# Patient Record
Sex: FEMALE | Race: BLACK OR AFRICAN AMERICAN | Employment: UNEMPLOYED | URBAN - METROPOLITAN AREA
[De-identification: names, ages, dates, MRNs, and addresses within clinical notes are randomized per-mention and may not be internally consistent; named-entity substitution may affect disease eponyms.]

---
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
## 2025-01-17 ENCOUNTER — ROUTINE PRENATAL (OUTPATIENT)
Age: 20
End: 2025-01-17
Payer: MEDICAID

## 2025-01-17 VITALS
BODY MASS INDEX: 22.95 KG/M2 | TEMPERATURE: 98.1 F | RESPIRATION RATE: 16 BRPM | WEIGHT: 129.5 LBS | HEART RATE: 82 BPM | DIASTOLIC BLOOD PRESSURE: 70 MMHG | HEIGHT: 63 IN | SYSTOLIC BLOOD PRESSURE: 108 MMHG | OXYGEN SATURATION: 98 %

## 2025-01-17 DIAGNOSIS — Z34.90 PREGNANCY, UNSPECIFIED GESTATIONAL AGE: Primary | ICD-10-CM

## 2025-01-17 PROCEDURE — 0500F INITIAL PRENATAL CARE VISIT: CPT | Performed by: OBSTETRICS & GYNECOLOGY

## 2025-01-17 SDOH — ECONOMIC STABILITY: FOOD INSECURITY: WITHIN THE PAST 12 MONTHS, THE FOOD YOU BOUGHT JUST DIDN'T LAST AND YOU DIDN'T HAVE MONEY TO GET MORE.: SOMETIMES TRUE

## 2025-01-17 SDOH — ECONOMIC STABILITY: FOOD INSECURITY: WITHIN THE PAST 12 MONTHS, YOU WORRIED THAT YOUR FOOD WOULD RUN OUT BEFORE YOU GOT MONEY TO BUY MORE.: SOMETIMES TRUE

## 2025-01-17 ASSESSMENT — PATIENT HEALTH QUESTIONNAIRE - PHQ9
SUM OF ALL RESPONSES TO PHQ9 QUESTIONS 1 & 2: 0
SUM OF ALL RESPONSES TO PHQ QUESTIONS 1-9: 0
2. FEELING DOWN, DEPRESSED OR HOPELESS: NOT AT ALL
1. LITTLE INTEREST OR PLEASURE IN DOING THINGS: NOT AT ALL
SUM OF ALL RESPONSES TO PHQ QUESTIONS 1-9: 0

## 2025-01-17 NOTE — PROGRESS NOTES
Initial Prenatal Note    CC: Amenorrhea, positive pregnancy test    HPI:  Billie Lechuga is a 19 y.o.  who presents for initial prenatal appointment. Patient is transferred from [facility]. Records have not been received yet for this patient.     LMP history:  Patient reports an LMP of 2024 which would give her a IWONA of 2025, making her 25 w 3d.    Patient reports being given a due date of 2025, which would make her 26 w 1 d.     We are unable to confirm appropriate due date at this time due to lack of records. Awaiting fax.       Relevant past pregnancy history:  She has the following pregnancy history: N/A   She does not history of  delivery.    Relevant past medical history:   Noncontributory    Last Pap: N/A    Relevant social history:  Her occupation is: N/A.   Her partner's name is Mio.    1. Have you been to the ER, urgent care clinic, or hospitalized since your last visit? New patient    2. Have you seen or consulted any other health care providers outside of the [facility] since your last visit? New patient    She declines  a chaperone during the gynecologic exam today.      Vandana Landry RN    
had an anatomy scan.  She believes she was given a due date in mid April.  We called [facility] today.  But were unable to speak with anyone about obtaining records.  At one point my RN was hung up on.  Will attempt again to obtain records.  Discussed coming back next week for an ultrasound.  I also asked the patient if she could access records on her phone.  She states she does not believe that she can.  Asthma   -Pulmicort and albuterol on her med list.  She denies taking these medications on a daily basis.  She reports that she does not even have these medications at home.  She reports she has not had an asthma attack since she was 10 years old.  She reports that she has never been intubated for an asthma attack.  She reports that she is never been hospitalized because of her asthma.    Intrauterine pregnancy with issues addressed in problem list.  We discussed genetic testing screening options for the pregnancy and offered NIPT and the patient reports she declines.  We discussed carrier screening as per ACOG guidelines for CF, SMA, DMD, and Fragile X syndrome and the patient declines carrier screening.    Course of pregnancy discussed including visit schedule, routine U/S, glucola testing, etc.  Avoid alcoholic beverages and illicit/recreational drugs use.  Take prenatal vitamins or folic acid daily.  Hospital and practice style discussed with coverage system.  Discussed nutrition, toxoplasmosis precautions, sexual activity, exercise, need for influenza vaccine, environmental and work hazards, travel advice, screen for domestic violence, need for seat belts.  Discussed seafood, unpasteurized dairy products, deli meat, artificial sweeteners, and caffeine.  Discussed current prescription drug use. Given medication list.  Discussed the use of over the counter medications and chemicals.  Route of delivery discussed, including risks, benefits  Handouts given to pt.      Patient will return to office   Return

## 2025-01-23 ENCOUNTER — ROUTINE PRENATAL (OUTPATIENT)
Age: 20
End: 2025-01-23

## 2025-01-23 VITALS
OXYGEN SATURATION: 100 % | DIASTOLIC BLOOD PRESSURE: 69 MMHG | WEIGHT: 130 LBS | SYSTOLIC BLOOD PRESSURE: 103 MMHG | HEART RATE: 80 BPM | BODY MASS INDEX: 23.03 KG/M2

## 2025-01-23 DIAGNOSIS — Z34.90 PREGNANCY, UNSPECIFIED GESTATIONAL AGE: Primary | ICD-10-CM

## 2025-01-23 PROCEDURE — 0502F SUBSEQUENT PRENATAL CARE: CPT | Performed by: OBSTETRICS & GYNECOLOGY

## 2025-01-23 RX ORDER — ASPIRIN 81 MG/1
81 TABLET ORAL DAILY
Qty: 30 TABLET | Refills: 6 | Status: SHIPPED | OUTPATIENT
Start: 2025-01-23

## 2025-01-23 NOTE — PROGRESS NOTES
Patient here for return OB visit at 27w0d. She reports no concerns.    Growth US today lagging one week. However, uncertain if we have to right IWONA established. We still have no records. Records release signed again today. Follow up in one week. Referral MFM.     She reports good fetal movement.   She denies vaginal bleeding, loss of fluid, abnormal vaginal discharge, UTI symptoms, cramping, or contractions.       /69   Pulse 80   Wt 59 kg (130 lb)   LMP 2024   SpO2 100%   BMI 23.03 kg/m²      Prenatal Fetal Information  Fetal HR: 140s  Movement: Present      Patient Active Problem List    Diagnosis Date Noted    Pregnancy 2025     Primary Provider: Aiden     EDC by: US/LMP  Social: transfer from [facility] at 27w0d   IOB labs:   Genetic Screening:Panoroma____ Horizon____  Anatomy:  3rd TM labs: GTT___ Hgb___ Plts___  Vaccines:   Flu:__ TDAP:__ RSV ___ 32-36 weeks September-January   Rhogam:      GBS:    Pain management in labor :   -Epidural  -Lamaze   -Hydrotherapy        Postpartum  -Breast/Bottle   -Circ: yes!  -Pap: NA  BC:      Problem List:  1) Transfer from [facility] at 27w0d   -no records for the first two visits.            Return in about 1 week (around 2025) for + glucola Rhogam ?.    Sarah Wolfe MD

## 2025-01-24 LAB
ABO GROUP BLD: NORMAL
BLD GP AB SCN SERPL QL: NEGATIVE
ERYTHROCYTE [DISTWIDTH] IN BLOOD BY AUTOMATED COUNT: 12.2 % (ref 11.7–15.4)
HBV SURFACE AG SERPL QL IA: NEGATIVE
HCT VFR BLD AUTO: 32.2 % (ref 34–46.6)
HCV IGG SERPL QL IA: NON REACTIVE
HGB BLD-MCNC: 10.8 G/DL (ref 11.1–15.9)
HIV 1+2 AB+HIV1 P24 AG SERPL QL IA: NON REACTIVE
MCH RBC QN AUTO: 32 PG (ref 26.6–33)
MCHC RBC AUTO-ENTMCNC: 33.5 G/DL (ref 31.5–35.7)
MCV RBC AUTO: 95 FL (ref 79–97)
PLATELET # BLD AUTO: 236 X10E3/UL (ref 150–450)
RBC # BLD AUTO: 3.38 X10E6/UL (ref 3.77–5.28)
RH BLD: POSITIVE
RUBV IGG SERPL IA-ACNC: 3.04 INDEX
VZV IGG SER QL IA: NON REACTIVE
WBC # BLD AUTO: 5 X10E3/UL (ref 3.4–10.8)

## 2025-01-25 LAB — TREPONEMA PALLIDUM IGG+IGM AB [PRESENCE] IN SERUM OR PLASMA BY IMMUNOASSAY: NON REACTIVE

## 2025-01-27 LAB
HGB A MFR BLD ELPH: 97.5 % (ref 96.4–98.8)
HGB A2 MFR BLD ELPH: 2.5 % (ref 1.8–3.2)
HGB F MFR BLD ELPH: 0 % (ref 0–2)
HGB FRACT BLD-IMP: NORMAL
HGB S MFR BLD ELPH: 0 %

## 2025-02-04 ENCOUNTER — ROUTINE PRENATAL (OUTPATIENT)
Age: 20
End: 2025-02-04
Payer: MEDICAID

## 2025-02-04 VITALS — SYSTOLIC BLOOD PRESSURE: 111 MMHG | DIASTOLIC BLOOD PRESSURE: 74 MMHG | HEART RATE: 109 BPM

## 2025-02-04 DIAGNOSIS — Z3A.28 28 WEEKS GESTATION OF PREGNANCY: Primary | ICD-10-CM

## 2025-02-04 PROCEDURE — 99203 OFFICE O/P NEW LOW 30 MIN: CPT | Performed by: OBSTETRICS & GYNECOLOGY

## 2025-02-04 PROCEDURE — 76811 OB US DETAILED SNGL FETUS: CPT | Performed by: OBSTETRICS & GYNECOLOGY

## 2025-02-04 NOTE — PROCEDURES
PATIENT: PRABHAKAR BEAR   -  : 2005   -  DOS:2025   -  INTERPRETING PROVIDER:Davdi Meade,   Indication  ========    FGR on outside scan    Method  ======    Transabdominal ultrasound examination. View: suboptimal due to unfavorable fetal position. suboptimal due to advanced gestational age    Dating  ======    LMP on: 2024  Cycle: regular cycle  GA by LMP 28 w + 0 d  IWONA by LMP: 2025  GA by prior assessment 28 w + 5 d  IWONA by prior assessment: 2025  Previous Ultrasound on: 2025  Type of prior assessment: GA  GA at prior assessment date 27 w + 0 d  GA by previous U/S 28 w + 5 d  IWONA by previous Ultrasound: 2025  Ultrasound examination on: 2025  GA by U/S based upon: AC, BPD, Femur, HC  GA by U/S 28 w + 4 d  IWONA by U/S: 2025  Assigned: based on the LMP, selected on 2025  Assigned GA 28 w + 0 d  Assigned IWONA: 2025    Fetal Growth Overview  =================    Exam date        GA              BPD (mm)          HC (mm)              AC (mm)              FL (mm)             HL (mm)             EFW (g)  2025          28w 0d        72.2    70%        262.5     36%        229.8    23%        55.3     69%        47.1    36%        1188     45%    Fetal Biometry  ============    Standard  BPD 72.2 mm 29w 0d 70% Hadlock  OFD 92.5 mm 29w 6d 90% Tri  .5 mm 28w 4d 36% Hadlock  Cerebellum tr 33.7 mm 28w 3d 64% Hill  .8 mm 27w 2d 23% Hadlock  Femur 55.3 mm 29w 1d 69% Hadlock  Humerus 47.1 mm 27w 5d 36% Tri  EFW 1,188 g 27w 6d 45% Hadlock  EFW (lb) 2 lb  EFW (oz) 10 oz  EFW by: Hadlock (BPD-HC-AC-FL)  Extended   4.9 mm  CM 5.6 mm  21% Nicolaides  Head / Face / Neck  Nasal bone: present  Other Structures   bpm    General Evaluation  ==============    Cardiac activity present.  bpm. Fetal movements: visualized. Presentation: Cephalic  Placenta: Placental site: fundal. Placental edge-to-cervical os distance 12.3 cm  Umbilical cord:

## 2025-02-04 NOTE — PROGRESS NOTES
Patient was seen 2/4/2025      Please look under media to view full consult and ultrasound report in ViewPoint.         David Meade MD  Maternal Fetal Medicine

## 2025-03-03 DIAGNOSIS — Z34.90 PREGNANCY, UNSPECIFIED GESTATIONAL AGE: Primary | ICD-10-CM

## 2025-03-04 ENCOUNTER — ROUTINE PRENATAL (OUTPATIENT)
Age: 20
End: 2025-03-04
Payer: MEDICAID

## 2025-03-04 ENCOUNTER — TELEPHONE (OUTPATIENT)
Age: 20
End: 2025-03-04

## 2025-03-04 VITALS — SYSTOLIC BLOOD PRESSURE: 110 MMHG | DIASTOLIC BLOOD PRESSURE: 71 MMHG | HEART RATE: 95 BPM

## 2025-03-04 DIAGNOSIS — Z34.90 PREGNANCY, UNSPECIFIED GESTATIONAL AGE: ICD-10-CM

## 2025-03-04 PROCEDURE — 76816 OB US FOLLOW-UP PER FETUS: CPT | Performed by: OBSTETRICS & GYNECOLOGY

## 2025-03-04 NOTE — PROCEDURES
artery: normal  Umbilical A sampling site: midcord  Umbilical A PI 0.72  7% Ebbing  Umbilical A RI 0.52  9% Maicol  Umbilical A PS 46.88 cm/s  49% Ebbing  Umbilical A ED 24.22 cm/s  Umbilical A EDF: positive  Umbilical A TAmax 35.14 cm/s  72% Ebbing  Umbilical A MD 23.72 cm/s  Umbilical A S / D 2.09  13% Maicol  Umbilical A  bpm    Findings  =======    Intrauterine Duron pregnancy at 32w 0d by clinical dates.  EFW is 1739 g at 20%, abdominal circumference at 14%.  Anatomy visualized as stated above.  Amniotic fluid: normal.  Placenta is fundal.  Cephalic presentation.  Biophysical profile score is 8/8.    Umbilical Artery Doppler studies were performed and positive end diastolic flow was demonstrated with normal resistance for the given gestational age.    The ultrasound findings as listed above and diagnostic limitations of ultrasound imaging, including inability to exclude all anomalies, have been reviewed with the patient. All  questions and concerns addressed.    Consultation  ==========    PRABHAKAR is 19 yrs of age,  at 32w 0d, referred due to suspected IUGR on an outside scan. The patient related that this scan was performed on --she does not  know the results and we do not have a copy of such.    PMH: None  POB: None  Meds: None  ROS: Her review of systems is negative. She does not complain of headaches, dizziness, visual or auditory symptoms, shortness of breath, palpitations, rashes, skeletal  problems, gastrointestinal symptoms, urinary symptoms, or any other complaints. Thus far in the pregnancy, she does not report any cramping, bleeding, or other  problems.    We reviewed that today's scan shows appropriate fetal growth. The anatomic survey is limited by the late EGA.  Follow up biometry continues to reveal normal fetal growth.  Further follow up is not planned.    Recommendations  ==============    No Maternal Fetal Medicine follow up is indicated. We will gladly see your patient

## 2025-03-04 NOTE — TELEPHONE ENCOUNTER
I let patient know that she can do her glucola but she should not only be seeing the  center  center is suppose to be the ADDITION care.. She also has to come to our office for prenatal visit as well.

## 2025-03-06 ENCOUNTER — ROUTINE PRENATAL (OUTPATIENT)
Age: 20
End: 2025-03-06
Payer: MEDICAID

## 2025-03-06 VITALS
OXYGEN SATURATION: 98 % | TEMPERATURE: 97.6 F | SYSTOLIC BLOOD PRESSURE: 113 MMHG | HEART RATE: 72 BPM | DIASTOLIC BLOOD PRESSURE: 75 MMHG | RESPIRATION RATE: 16 BRPM | HEIGHT: 63 IN | WEIGHT: 136.8 LBS | BODY MASS INDEX: 24.24 KG/M2

## 2025-03-06 DIAGNOSIS — Z34.90 PREGNANCY, UNSPECIFIED GESTATIONAL AGE: Primary | ICD-10-CM

## 2025-03-06 PROCEDURE — 0502F SUBSEQUENT PRENATAL CARE: CPT | Performed by: OBSTETRICS & GYNECOLOGY

## 2025-03-06 PROCEDURE — 90715 TDAP VACCINE 7 YRS/> IM: CPT | Performed by: OBSTETRICS & GYNECOLOGY

## 2025-03-06 PROCEDURE — 90471 IMMUNIZATION ADMIN: CPT | Performed by: OBSTETRICS & GYNECOLOGY

## 2025-03-06 NOTE — PROGRESS NOTES
Patient here for return OB visit at 33w0d.       She reports good fetal movement.   She denies vaginal bleeding, loss of fluid, abnormal vaginal discharge, UTI symptoms, cramping, or contractions.       /75 (Site: Right Upper Arm, Position: Sitting)   Pulse 72   Temp 97.6 °F (36.4 °C) (Oral)   Resp 16   Ht 1.6 m (5' 3\")   Wt 62.1 kg (136 lb 12.8 oz)   LMP 2024   SpO2 98%   BMI 24.23 kg/m²        Prenatal Fetal Information  Fetal HR: 135  Movement: Present      Patient Active Problem List    Diagnosis Date Noted    Pregnancy 2025     Primary Provider: Aiden ENCISO   EDC by: US/LMP  Social: transfer from [facility] at 27w0d   IOB labs: Hep B neg.Hep C neg. RPR neg. HIV neg. Varicella NON IMMUNE /Rubella immune O Positive  . Normal hemoglobin electrophoresis. Hgb__. Platelet count____   Genetic Screening:Panoroma____ Horizon____  Anatomy:  3rd TM labs: GTT___ Hgb___ Plts___  Vaccines:   Flu:__ TDAP:__ RSV ___ 32-36 weeks September-January   Rhogam:      GBS:    Pain management in labor :   -Epidural  -Lamaze   -Hydrotherapy        Postpartum  -Breast/Bottle   -Circ: yes!  -Pap: NA  -BC:      Problem List:  1) Transfer from [facility] at 27w0d   -no records for the first two visits.   2) Varicella NONIMMUNE            Return in about 2 weeks (around 3/20/2025).    Sarah Wolfe MD

## 2025-03-06 NOTE — PROGRESS NOTES
Billie Lechuga is 33w0d   Doing well  +FM  Denies LOF, bleeding/spotting, cramping, headache, blurred vision, edema, or RUQ pain.     Desires Tdap today

## 2025-03-07 LAB
BLD GP AB SCN SERPL QL: NEGATIVE
ERYTHROCYTE [DISTWIDTH] IN BLOOD BY AUTOMATED COUNT: 12 % (ref 11.7–15.4)
FERRITIN SERPL-MCNC: 13 NG/ML (ref 15–77)
GLUCOSE 1H P 50 G GLC PO SERPL-MCNC: 82 MG/DL (ref 70–139)
HCT VFR BLD AUTO: 31.6 % (ref 34–46.6)
HGB BLD-MCNC: 10.8 G/DL (ref 11.1–15.9)
MCH RBC QN AUTO: 31.9 PG (ref 26.6–33)
MCHC RBC AUTO-ENTMCNC: 34.2 G/DL (ref 31.5–35.7)
MCV RBC AUTO: 93 FL (ref 79–97)
PLATELET # BLD AUTO: 241 X10E3/UL (ref 150–450)
RBC # BLD AUTO: 3.39 X10E6/UL (ref 3.77–5.28)
WBC # BLD AUTO: 6.8 X10E3/UL (ref 3.4–10.8)

## 2025-03-08 LAB — TREPONEMA PALLIDUM IGG+IGM AB [PRESENCE] IN SERUM OR PLASMA BY IMMUNOASSAY: NON REACTIVE

## 2025-03-09 ENCOUNTER — RESULTS FOLLOW-UP (OUTPATIENT)
Age: 20
End: 2025-03-09

## 2025-03-09 RX ORDER — FERROUS SULFATE 325(65) MG
325 TABLET, DELAYED RELEASE (ENTERIC COATED) ORAL
Qty: 30 TABLET | Refills: 11 | Status: SHIPPED | OUTPATIENT
Start: 2025-03-09

## 2025-03-20 ENCOUNTER — ROUTINE PRENATAL (OUTPATIENT)
Age: 20
End: 2025-03-20
Payer: MEDICAID

## 2025-03-20 VITALS
WEIGHT: 132.2 LBS | DIASTOLIC BLOOD PRESSURE: 78 MMHG | SYSTOLIC BLOOD PRESSURE: 106 MMHG | OXYGEN SATURATION: 98 % | HEART RATE: 76 BPM | RESPIRATION RATE: 16 BRPM | HEIGHT: 63 IN | TEMPERATURE: 97.7 F | BODY MASS INDEX: 23.42 KG/M2

## 2025-03-20 DIAGNOSIS — N89.8 VAGINAL DISCHARGE: Primary | ICD-10-CM

## 2025-03-20 DIAGNOSIS — Z34.90 PREGNANCY, UNSPECIFIED GESTATIONAL AGE: ICD-10-CM

## 2025-03-20 PROCEDURE — 99212 OFFICE O/P EST SF 10 MIN: CPT | Performed by: OBSTETRICS & GYNECOLOGY

## 2025-03-20 NOTE — PROGRESS NOTES
Billie Lechuga is 35w0d   Doing well  +FM  Denies bleeding/spotting, cramping, headache, blurred vision, edema, or RUQ pain.     Reports 2 episodes of LOF - Monday and yesterday

## 2025-03-24 LAB
A VAGINAE DNA VAG QL NAA+PROBE: ABNORMAL SCORE
BVAB2 DNA VAG QL NAA+PROBE: ABNORMAL SCORE
C ALBICANS DNA VAG QL NAA+PROBE: POSITIVE
C GLABRATA DNA VAG QL NAA+PROBE: NEGATIVE
C TRACH DNA SPEC QL NAA+PROBE: NEGATIVE
MEGA1 DNA VAG QL NAA+PROBE: ABNORMAL SCORE
N GONORRHOEA DNA VAG QL NAA+PROBE: POSITIVE
T VAGINALIS DNA VAG QL NAA+PROBE: NEGATIVE

## 2025-03-25 ENCOUNTER — RESULTS FOLLOW-UP (OUTPATIENT)
Age: 20
End: 2025-03-25

## 2025-03-25 DIAGNOSIS — O98.213 GONORRHEA AFFECTING PREGNANCY IN THIRD TRIMESTER: ICD-10-CM

## 2025-03-25 DIAGNOSIS — N89.8 VAGINAL DISCHARGE: Primary | ICD-10-CM

## 2025-03-25 DIAGNOSIS — A54.9 GONORRHEA: ICD-10-CM

## 2025-03-25 RX ORDER — CEFTRIAXONE 500 MG/1
500 INJECTION, POWDER, FOR SOLUTION INTRAMUSCULAR; INTRAVENOUS ONCE
Qty: 1 EACH | Refills: 0
Start: 2025-03-25 | End: 2025-03-26 | Stop reason: SDUPTHER

## 2025-03-25 RX ORDER — METRONIDAZOLE 500 MG/1
500 TABLET ORAL 2 TIMES DAILY
Qty: 14 TABLET | Refills: 0 | Status: SHIPPED | OUTPATIENT
Start: 2025-03-25 | End: 2025-04-01

## 2025-03-26 RX ORDER — CEFTRIAXONE 500 MG/1
500 INJECTION, POWDER, FOR SOLUTION INTRAMUSCULAR; INTRAVENOUS ONCE
Qty: 1 EACH | Refills: 0 | Status: SHIPPED | OUTPATIENT
Start: 2025-03-26 | End: 2025-03-28 | Stop reason: ALTCHOICE

## 2025-03-27 ENCOUNTER — ROUTINE PRENATAL (OUTPATIENT)
Age: 20
End: 2025-03-27
Payer: MEDICAID

## 2025-03-27 VITALS
TEMPERATURE: 98 F | WEIGHT: 138 LBS | OXYGEN SATURATION: 97 % | HEART RATE: 86 BPM | RESPIRATION RATE: 14 BRPM | SYSTOLIC BLOOD PRESSURE: 109 MMHG | DIASTOLIC BLOOD PRESSURE: 73 MMHG | HEIGHT: 63 IN | BODY MASS INDEX: 24.45 KG/M2

## 2025-03-27 DIAGNOSIS — O98.213 GONORRHEA AFFECTING PREGNANCY IN THIRD TRIMESTER: ICD-10-CM

## 2025-03-27 DIAGNOSIS — Z34.90 PREGNANCY, UNSPECIFIED GESTATIONAL AGE: Primary | ICD-10-CM

## 2025-03-27 DIAGNOSIS — A54.9 GONORRHEA: ICD-10-CM

## 2025-03-27 PROCEDURE — 99212 OFFICE O/P EST SF 10 MIN: CPT | Performed by: OBSTETRICS & GYNECOLOGY

## 2025-03-27 RX ORDER — CEFTRIAXONE 500 MG/1
500 INJECTION, POWDER, FOR SOLUTION INTRAMUSCULAR; INTRAVENOUS ONCE
Status: SHIPPED | OUTPATIENT
Start: 2025-03-27

## 2025-03-27 NOTE — PROGRESS NOTES
Doing well since last visit    Active FM  Denies lof, vb & contractions    GBS swab today, declines cervical check today    Per Dr winn 500mg of rocephin sent in to be injected for one dose.

## 2025-03-27 NOTE — PROGRESS NOTES
Patient here for return OB visit at 36w0d.     Has not had Ceftriaxone yet. Going to pick it up today and return to our office injection.       She reports good fetal movement.   She denies vaginal bleeding, loss of fluid, abnormal vaginal discharge, UTI symptoms, cramping, or contractions.       /73   Pulse 86   Temp 98 °F (36.7 °C) (Oral)   Resp 14   Ht 1.6 m (5' 3\")   Wt 62.6 kg (138 lb)   LMP 2024   SpO2 97%   BMI 24.45 kg/m²    FH:34    Prenatal Fetal Information  Fetal HR: 130's  Movement: Present      Patient Active Problem List    Diagnosis Date Noted    Pregnancy 2025     Primary Provider: Aiden     EDC by:   Social: transfer from [facility] at 27w0d   IOB labs: Hep B neg.Hep C neg. RPR neg. HIV neg. Varicella NON IMMUNE /Rubella immune O Positive  . Normal hemoglobin electrophoresis. H  Genetic Screening:Panoroma____ Horizon____  Anatomy:normal with [facility]  3rd TM labs: GTT passed. Hgb:10.8  Plts 236  Vaccines:   Flu declines TDAP:__ RSV: Not indicated, 32-36 weeks September-January   Rhogam: O Positive      GBS:    Pain management in labor :   -Epidural  -Lamaze   -Hydrotherapy        Postpartum  -Breast/Bottle   -Circ: yes!  -Pap: NA  -BC:      Problem List:  1) Transfer from [facility] at 27w0d   -no records for the first two visits.   2) Varicella NONIMMUNE            Return for weekly visit for the next four weeks.      Sarah Wolfe MD

## 2025-03-28 ENCOUNTER — TELEPHONE (OUTPATIENT)
Age: 20
End: 2025-03-28

## 2025-03-28 DIAGNOSIS — A54.9 GONORRHEA: Primary | ICD-10-CM

## 2025-03-28 DIAGNOSIS — O98.213 GONORRHEA AFFECTING PREGNANCY IN THIRD TRIMESTER: ICD-10-CM

## 2025-03-28 RX ORDER — CEFTRIAXONE 500 MG/1
500 INJECTION, POWDER, FOR SOLUTION INTRAMUSCULAR; INTRAVENOUS ONCE
Qty: 1 EACH | Refills: 0 | Status: SHIPPED | OUTPATIENT
Start: 2025-03-28 | End: 2025-03-28

## 2025-03-28 RX ORDER — CEFTRIAXONE 500 MG/1
500 INJECTION, POWDER, FOR SOLUTION INTRAMUSCULAR; INTRAVENOUS ONCE
Qty: 1 EACH | Refills: 0
Start: 2025-03-28 | End: 2025-03-28 | Stop reason: ALTCHOICE

## 2025-03-28 NOTE — TELEPHONE ENCOUNTER
Spoke to patient via telephone call. Name and  confirmed. Patient states she has been unable to  her Rocephin due to it being unavailable at the pharmacy.     This RN called [facility]. Pharmacy states their location is unable to obtain medication. Advised to send prescription to new updated pharmacy. Prescription sent per Dr. Wolfe's telephone order

## 2025-03-28 NOTE — TELEPHONE ENCOUNTER
----- Message from Dr. Sarah Wolfe MD sent at 3/25/2025  7:02 PM EDT -----  Patient needs to come in tomorrow for ceftriaxone injection for gonorrhea. [facility].

## 2025-03-29 ENCOUNTER — RESULTS FOLLOW-UP (OUTPATIENT)
Age: 20
End: 2025-03-29

## 2025-03-29 LAB — GP B STREP DNA SPEC QL NAA+PROBE: POSITIVE

## 2025-04-01 ENCOUNTER — HOSPITAL ENCOUNTER (EMERGENCY)
Facility: HOSPITAL | Age: 20
Discharge: HOME | End: 2025-04-01
Payer: MEDICAID

## 2025-04-01 VITALS
SYSTOLIC BLOOD PRESSURE: 118 MMHG | DIASTOLIC BLOOD PRESSURE: 75 MMHG | RESPIRATION RATE: 16 BRPM | TEMPERATURE: 99 F | HEART RATE: 94 BPM

## 2025-04-01 PROCEDURE — 99283 EMERGENCY DEPT VISIT LOW MDM: CPT

## 2025-04-01 PROCEDURE — 4500000002 HC ER NO CHARGE

## 2025-04-02 NOTE — PROGRESS NOTES
2124: Patient came from home with c/o ROM after using the bathroom and noticed some leaking of fluid after wiping. Confirms positive fetal movement and denies any VB.   2136: AXEL Mendoza at bedside.   2139: Negative nitrazine. Amnisure started.   2152: Amnisure negative. Plan to d/c patient home with reasons to return/signs and symptoms of SROM reviewed. Patient will follow up with OB on Thursday or return to [facility] sooner if needed. All questions answered.   2158: Discharge paperwork given and explained to patient. No questions at this time. Patient walked off unit. D/c home

## 2025-04-02 NOTE — ED NOTES
HPI Patient is a 19 y.o.  @ 36w5d with verito of 2025 who presents to the [facility] at 2124 reporting gush of fluid after voiding today. Today, she reports good fetal movement and denies vaginal bleeding, nausea/vomiting/diarrhea, fever, cough, or sore throat. She reports regular prenatal care with [facility] and denies any issues with this pregnancy. She does have hx of untreated GC for which she has been prescribed Rocephin (has not brought meds back to office for IM injection). She is GBS pos as well.       Chief Complaint   Patient presents with    Rupture of Membranes       Past Medical History:   Diagnosis Date    Asthma        Past Surgical History:   Procedure Laterality Date    HERNIA REPAIR      as an infant/toddler per pt unsure of specific details         History reviewed. No pertinent family history.    Social History     Socioeconomic History    Marital status: Single     Spouse name: Not on file    Number of children: Not on file    Years of education: Not on file    Highest education level: Not on file   Occupational History    Not on file   Tobacco Use    Smoking status: Never     Passive exposure: Yes    Smokeless tobacco: Never   Vaping Use    Vaping status: Never Used   Substance and Sexual Activity    Alcohol use: Never    Drug use: Not Currently    Sexual activity: Yes     Partners: Male   Other Topics Concern    Not on file   Social History Narrative    Not on file     Social Drivers of Health     Financial Resource Strain: Not on file   Food Insecurity: Food Insecurity Present (2025)    Hunger Vital Sign     Worried About Running Out of Food in the Last Year: Sometimes true     Ran Out of Food in the Last Year: Sometimes true   Transportation Needs: Unmet Transportation Needs (2025)    PRAPARE - Transportation     Lack of Transportation (Medical): Yes     Lack of Transportation (Non-Medical): Yes   Physical Activity: Not on file   Stress: Not on file   Social Connections: Not on file

## 2025-04-03 ENCOUNTER — ROUTINE PRENATAL (OUTPATIENT)
Age: 20
End: 2025-04-03
Payer: MEDICAID

## 2025-04-03 VITALS
BODY MASS INDEX: 24.45 KG/M2 | OXYGEN SATURATION: 97 % | SYSTOLIC BLOOD PRESSURE: 109 MMHG | HEART RATE: 67 BPM | DIASTOLIC BLOOD PRESSURE: 71 MMHG | WEIGHT: 138 LBS

## 2025-04-03 DIAGNOSIS — Z34.90 PREGNANCY, UNSPECIFIED GESTATIONAL AGE: Primary | ICD-10-CM

## 2025-04-03 PROCEDURE — 0502F SUBSEQUENT PRENATAL CARE: CPT | Performed by: OBSTETRICS & GYNECOLOGY

## 2025-04-03 PROCEDURE — 96372 THER/PROPH/DIAG INJ SC/IM: CPT | Performed by: OBSTETRICS & GYNECOLOGY

## 2025-04-03 RX ADMIN — CEFTRIAXONE 500 MG: 500 INJECTION, POWDER, FOR SOLUTION INTRAMUSCULAR; INTRAVENOUS at 09:25

## 2025-04-03 NOTE — PROGRESS NOTES
Patient here for return OB visit at 37w0d.     Rocephin today for gonorrhea       She reports good fetal movement.   She denies vaginal bleeding, loss of fluid, abnormal vaginal discharge, UTI symptoms, cramping, or contractions.       /71 (BP Site: Left Upper Arm, Patient Position: Sitting)   Pulse 67   Wt 62.6 kg (138 lb)   LMP 2024   SpO2 97%   BMI 24.45 kg/m²    FH:    Prenatal Fetal Information  Fetal HR: 130s  Movement: Present      Patient Active Problem List    Diagnosis Date Noted    Pregnancy 2025     Primary Provider: Aiden ENCISO   EDC by: 1st TM US  Social: transfer from [facility] at 27w0d   IOB labs: Hep B neg.Hep C neg. RPR neg. HIV neg. Varicella NON IMMUNE /Rubella immune O Positive  . Normal hemoglobin electrophoresis. H  Genetic Screening:Panoroma declined Horizon declined  Anatomy:normal with [facility]  3rd TM labs: GTT passed. Hgb:10.8  Plts 236  Vaccines:   Flu declines TDAP: 3/6/25 RSV: __32-36 weeks September-January   Rhogam: O Positive      GBS:Positive! NKDA    Pain management in labor :   -Epidural  -Lamaze   -Hydrotherapy        Postpartum  -Breast/Bottle   -Circ: yes!  -Pap: NA  -BC:      Problem List:  1) Transfer from [facility] at 27w0d   -no records for the first two visits.   2) Varicella NONIMMUNE   3) Gonorrhea + at 36 weeks   4) GBS positive          Return in about 1 week (around 4/10/2025).    Sarah Wolfe MD

## 2025-04-10 ENCOUNTER — ROUTINE PRENATAL (OUTPATIENT)
Age: 20
End: 2025-04-10

## 2025-04-10 VITALS
RESPIRATION RATE: 16 BRPM | SYSTOLIC BLOOD PRESSURE: 121 MMHG | TEMPERATURE: 98 F | HEART RATE: 77 BPM | HEIGHT: 63 IN | OXYGEN SATURATION: 98 % | DIASTOLIC BLOOD PRESSURE: 82 MMHG | WEIGHT: 141.4 LBS | BODY MASS INDEX: 25.05 KG/M2

## 2025-04-10 DIAGNOSIS — Z34.90 PREGNANCY, UNSPECIFIED GESTATIONAL AGE: Primary | ICD-10-CM

## 2025-04-10 PROCEDURE — 0502F SUBSEQUENT PRENATAL CARE: CPT | Performed by: OBSTETRICS & GYNECOLOGY

## 2025-04-10 NOTE — PROGRESS NOTES
Patient here for return OB visit at 38w0d     Patient requests a cervical exam today but did not tolerate the exam.   Discussed my rec for follow up US which was scheduled       She reports good fetal movement.   She denies vaginal bleeding, loss of fluid, abnormal vaginal discharge, UTI symptoms, cramping, or contractions.       /82 (BP Site: Left Upper Arm, Patient Position: Sitting)   Pulse 77   Temp 98 °F (36.7 °C) (Oral)   Resp 16   Ht 1.6 m (5' 3\")   Wt 64.1 kg (141 lb 6.4 oz)   LMP 2024   SpO2 98%   BMI 25.05 kg/m²    Cervix:unable to tolerate exam   FH:36      Patient Active Problem List    Diagnosis Date Noted    Pregnancy 2025     Primary Provider: Aiden ENCISO   EDC by: 1st TM   Social: transfer from [facility] at 27w0d   IOB labs: Hep B neg.Hep C neg. RPR neg. HIV neg. Varicella NON IMMUNE /Rubella immune O Positive  . Normal hemoglobin electrophoresis. H  Genetic Screening:Panoroma declined Horizon declined  Anatomy:normal with [facility]  3rd TM labs: GTT passed. Hgb:10.8  Plts 236  Vaccines:   Flu declines TDAP: 3/6/25 RSV: __32-36 weeks September-January   Rhogam: O Positive      GBS:Positive! NKDA    Pain management in labor :   -Epidural  -Lamaze   -Hydrotherapy        Postpartum  -Breast/Bottle   -Circ: yes!  -Pap: NA  -BC:      Problem List:  1) Transfer from [facility] at 27w0d   -no records for the first two visits.   2) Varicella NONIMMUNE   3) Gonorrhea + at 36 weeks   4) GBS positive            Return in 1 week (on 2025).     Sarah Wolfe MD

## 2025-04-10 NOTE — PROGRESS NOTES
Billie Lechuga is 38w0d   Doing well  +FM  Denies LOF, bleeding/spotting, cramping, headache, blurred vision, edema, or RUQ pain.     Desires cervical exam.

## 2025-04-11 DIAGNOSIS — Z34.90 PREGNANCY, UNSPECIFIED GESTATIONAL AGE: Primary | ICD-10-CM

## 2025-04-11 NOTE — PROGRESS NOTES
Patient to be seen in office 04/15/2025 for Growth ultrasound and prenatal visit. Ultrasound order pended for signing.

## 2025-04-15 ENCOUNTER — HOSPITAL ENCOUNTER (INPATIENT)
Facility: HOSPITAL | Age: 20
LOS: 3 days | Discharge: HOME | End: 2025-04-18
Attending: OBSTETRICS & GYNECOLOGY | Admitting: OBSTETRICS & GYNECOLOGY
Payer: MEDICAID

## 2025-04-15 ENCOUNTER — ROUTINE PRENATAL (OUTPATIENT)
Age: 20
End: 2025-04-15
Payer: MEDICAID

## 2025-04-15 VITALS
TEMPERATURE: 98.1 F | HEART RATE: 84 BPM | BODY MASS INDEX: 25.12 KG/M2 | SYSTOLIC BLOOD PRESSURE: 112 MMHG | DIASTOLIC BLOOD PRESSURE: 80 MMHG | HEIGHT: 63 IN | OXYGEN SATURATION: 98 % | RESPIRATION RATE: 16 BRPM | WEIGHT: 141.8 LBS

## 2025-04-15 DIAGNOSIS — Z34.90 PREGNANCY, UNSPECIFIED GESTATIONAL AGE: Primary | ICD-10-CM

## 2025-04-15 PROBLEM — O36.5990 PREGNANCY AFFECTED BY FETAL GROWTH RESTRICTION: Status: ACTIVE | Noted: 2025-04-15

## 2025-04-15 LAB
ABO + RH BLD: NORMAL
BASOPHILS # BLD: 0.02 K/UL (ref 0–0.1)
BASOPHILS NFR BLD: 0.4 % (ref 0–1)
BLOOD GROUP ANTIBODIES SERPL: NORMAL
DIFFERENTIAL METHOD BLD: ABNORMAL
EOSINOPHIL # BLD: 0.04 K/UL (ref 0–0.4)
EOSINOPHIL NFR BLD: 0.7 % (ref 0–7)
ERYTHROCYTE [DISTWIDTH] IN BLOOD BY AUTOMATED COUNT: 13.3 % (ref 11.5–14.5)
HCT VFR BLD AUTO: 28.5 % (ref 35–47)
HGB BLD-MCNC: 9.4 G/DL (ref 11.5–16)
IMM GRANULOCYTES # BLD AUTO: 0.03 K/UL (ref 0–0.04)
IMM GRANULOCYTES NFR BLD AUTO: 0.5 % (ref 0–0.5)
LYMPHOCYTES # BLD: 1.13 K/UL (ref 0.8–3.5)
LYMPHOCYTES NFR BLD: 19.8 % (ref 12–49)
MCH RBC QN AUTO: 30 PG (ref 26–34)
MCHC RBC AUTO-ENTMCNC: 33 G/DL (ref 30–36.5)
MCV RBC AUTO: 91.1 FL (ref 80–99)
MONOCYTES # BLD: 0.65 K/UL (ref 0–1)
MONOCYTES NFR BLD: 11.4 % (ref 5–13)
NEUTS SEG # BLD: 3.83 K/UL (ref 1.8–8)
NEUTS SEG NFR BLD: 67.2 % (ref 32–75)
NRBC # BLD: 0 K/UL (ref 0–0.01)
NRBC BLD-RTO: 0 PER 100 WBC
PLATELET # BLD AUTO: 236 K/UL (ref 150–400)
PMV BLD AUTO: 12.5 FL (ref 8.9–12.9)
RBC # BLD AUTO: 3.13 M/UL (ref 3.8–5.2)
RPR SER QL: NONREACTIVE
SPECIMEN EXP DATE BLD: NORMAL
WBC # BLD AUTO: 5.7 K/UL (ref 3.6–11)

## 2025-04-15 PROCEDURE — 1100000000 HC RM PRIVATE

## 2025-04-15 PROCEDURE — 4A1HXCZ MONITORING OF PRODUCTS OF CONCEPTION, CARDIAC RATE, EXTERNAL APPROACH: ICD-10-PCS | Performed by: OBSTETRICS & GYNECOLOGY

## 2025-04-15 PROCEDURE — 3E0DXGC INTRODUCTION OF OTHER THERAPEUTIC SUBSTANCE INTO MOUTH AND PHARYNX, EXTERNAL APPROACH: ICD-10-PCS | Performed by: OBSTETRICS & GYNECOLOGY

## 2025-04-15 PROCEDURE — 85025 COMPLETE CBC W/AUTO DIFF WBC: CPT

## 2025-04-15 PROCEDURE — 86592 SYPHILIS TEST NON-TREP QUAL: CPT

## 2025-04-15 PROCEDURE — 6370000000 HC RX 637 (ALT 250 FOR IP): Performed by: OBSTETRICS & GYNECOLOGY

## 2025-04-15 PROCEDURE — 7210000100 HC LABOR FEE PER 1 HR

## 2025-04-15 PROCEDURE — 3E033VJ INTRODUCTION OF OTHER HORMONE INTO PERIPHERAL VEIN, PERCUTANEOUS APPROACH: ICD-10-PCS | Performed by: OBSTETRICS & GYNECOLOGY

## 2025-04-15 PROCEDURE — 0503F POSTPARTUM CARE VISIT: CPT | Performed by: OBSTETRICS & GYNECOLOGY

## 2025-04-15 PROCEDURE — 86850 RBC ANTIBODY SCREEN: CPT

## 2025-04-15 PROCEDURE — 86901 BLOOD TYPING SEROLOGIC RH(D): CPT

## 2025-04-15 PROCEDURE — 86900 BLOOD TYPING SEROLOGIC ABO: CPT

## 2025-04-15 PROCEDURE — 59200 INSERT CERVICAL DILATOR: CPT

## 2025-04-15 RX ORDER — MISOPROSTOL 200 UG/1
400 TABLET ORAL PRN
Status: DISCONTINUED | OUTPATIENT
Start: 2025-04-15 | End: 2025-04-18 | Stop reason: HOSPADM

## 2025-04-15 RX ORDER — SODIUM CHLORIDE 0.9 % (FLUSH) 0.9 %
5-40 SYRINGE (ML) INJECTION EVERY 12 HOURS SCHEDULED
Status: DISCONTINUED | OUTPATIENT
Start: 2025-04-15 | End: 2025-04-17 | Stop reason: SDUPTHER

## 2025-04-15 RX ORDER — SODIUM CHLORIDE, SODIUM LACTATE, POTASSIUM CHLORIDE, AND CALCIUM CHLORIDE .6; .31; .03; .02 G/100ML; G/100ML; G/100ML; G/100ML
500 INJECTION, SOLUTION INTRAVENOUS PRN
Status: DISCONTINUED | OUTPATIENT
Start: 2025-04-15 | End: 2025-04-18 | Stop reason: HOSPADM

## 2025-04-15 RX ORDER — ONDANSETRON 2 MG/ML
4 INJECTION INTRAMUSCULAR; INTRAVENOUS EVERY 6 HOURS PRN
Status: DISCONTINUED | OUTPATIENT
Start: 2025-04-15 | End: 2025-04-16 | Stop reason: SDUPTHER

## 2025-04-15 RX ORDER — SODIUM CHLORIDE 0.9 % (FLUSH) 0.9 %
5-40 SYRINGE (ML) INJECTION PRN
Status: DISCONTINUED | OUTPATIENT
Start: 2025-04-15 | End: 2025-04-17 | Stop reason: SDUPTHER

## 2025-04-15 RX ORDER — SODIUM CHLORIDE 9 MG/ML
25 INJECTION, SOLUTION INTRAVENOUS PRN
Status: DISCONTINUED | OUTPATIENT
Start: 2025-04-15 | End: 2025-04-17 | Stop reason: SDUPTHER

## 2025-04-15 RX ORDER — ACETAMINOPHEN 325 MG/1
650 TABLET ORAL EVERY 4 HOURS PRN
Status: DISCONTINUED | OUTPATIENT
Start: 2025-04-15 | End: 2025-04-17 | Stop reason: SDUPTHER

## 2025-04-15 RX ORDER — SODIUM CHLORIDE, SODIUM LACTATE, POTASSIUM CHLORIDE, CALCIUM CHLORIDE 600; 310; 30; 20 MG/100ML; MG/100ML; MG/100ML; MG/100ML
INJECTION, SOLUTION INTRAVENOUS CONTINUOUS
Status: DISCONTINUED | OUTPATIENT
Start: 2025-04-15 | End: 2025-04-18 | Stop reason: HOSPADM

## 2025-04-15 RX ORDER — CARBOPROST TROMETHAMINE 250 UG/ML
250 INJECTION, SOLUTION INTRAMUSCULAR PRN
Status: DISCONTINUED | OUTPATIENT
Start: 2025-04-15 | End: 2025-04-18 | Stop reason: HOSPADM

## 2025-04-15 RX ORDER — ONDANSETRON 4 MG/1
4 TABLET, ORALLY DISINTEGRATING ORAL EVERY 6 HOURS PRN
Status: DISCONTINUED | OUTPATIENT
Start: 2025-04-15 | End: 2025-04-16 | Stop reason: SDUPTHER

## 2025-04-15 RX ORDER — METHYLERGONOVINE MALEATE 0.2 MG/ML
200 INJECTION INTRAVENOUS PRN
Status: DISCONTINUED | OUTPATIENT
Start: 2025-04-15 | End: 2025-04-18 | Stop reason: HOSPADM

## 2025-04-15 RX ORDER — TERBUTALINE SULFATE 1 MG/ML
0.25 INJECTION SUBCUTANEOUS
Status: DISCONTINUED | OUTPATIENT
Start: 2025-04-15 | End: 2025-04-18 | Stop reason: HOSPADM

## 2025-04-15 RX ADMIN — Medication 25 MCG: at 21:29

## 2025-04-15 RX ADMIN — Medication 25 MCG: at 17:27

## 2025-04-15 NOTE — PROGRESS NOTES
Billie Lechuga is 38w5d   Doing well  +FM  Denies LOF, bleeding/spotting, cramping, headache, blurred vision, edema, or RUQ pain.     Declines cervical exam

## 2025-04-15 NOTE — PLAN OF CARE
Problem: Pain  Goal: Verbalizes/displays adequate comfort level or baseline comfort level  Outcome: Progressing     Problem: Vaginal Birth or  Section  Goal: Fetal and maternal status remain reassuring during the birth process  Description:  Birth OB-Pregnancy care plan goal which identifies if the fetal and maternal status remain reassuring during the birth process  Outcome: Progressing     Problem: Postpartum  Goal: Experiences normal postpartum course  Description:  Postpartum OB-Pregnancy care plan goal which identifies if the mother is experiencing a normal postpartum course  Outcome: Progressing  Goal: Appropriate maternal -  bonding  Description:  Postpartum OB-Pregnancy care plan goal which identifies if the mother and  are bonding appropriately  Outcome: Progressing  Goal: Establishment of infant feeding pattern  Description:  Postpartum OB-Pregnancy care plan goal which identifies if the mother is establishing a feeding pattern with their   Outcome: Progressing  Goal: Incisions, wounds, or drain sites healing without S/S of infection  Outcome: Progressing     Problem: Infection - Adult  Goal: Absence of infection at discharge  Outcome: Progressing  Goal: Absence of infection during hospitalization  Outcome: Progressing  Goal: Absence of fever/infection during anticipated neutropenic period  Outcome: Progressing     Problem: Safety - Adult  Goal: Free from fall injury  Outcome: Progressing     Problem: Discharge Planning  Goal: Discharge to home or other facility with appropriate resources  Outcome: Progressing

## 2025-04-15 NOTE — PROGRESS NOTES
1940:  Received bedside shift report from CLOVIS Chavez.  Pt sitting up at this time eating dinner and has no complaints.  POC explained; pt and family verbalized understanding.    0521:  AXEL Francisco notified pt c/o pain and requesting pain medicine.  Orders received for Nubain 10mg prn q 3 hr.  RBAC.    0726:  JUSTINE Wolfe MD updated on pt.    0741:  Bedside shift report given to CLOVIS Galeana.

## 2025-04-15 NOTE — PROGRESS NOTES
1553: patient arrived to LD4 for scheduled induction for IUGR.  Reports positive fetal movement; denies contractions, LOF, or bleeding.  Placed on monitors.

## 2025-04-15 NOTE — H&P
History & Physical    Name: Billie Lechuga MRN: [id number]  SSN: [id number]    YOB: 2005  Age: 19 y.o.  Sex: female        Subjective:     Estimated Date of Delivery: 25  OB History          1    Para        Term                AB        Living             SAB        IAB        Ectopic        Molar        Multiple        Live Births                    Ms. Lechuga is admitted with pregnancy at 38w5d for  fetal growth restriction . Prenatal course was complicated by gonorrhea s/p treatment two weeks ago. Please see prenatal records for details.    Patient Active Problem List    Diagnosis Date Noted    Pregnancy affected by fetal growth restriction 04/15/2025    Pregnancy 2025     Primary Provider: Aiden ENCISO   EDC by: 1st TM   Social: transfer from [facility] at 27w0d   IOB labs: Hep B neg.Hep C neg. RPR neg. HIV neg. Varicella NON IMMUNE /Rubella immune O Positive  . Normal hemoglobin electrophoresis.  Genetic Screening:Panoroma declined Horizon declined  Anatomy:normal with [facility]  3rd TM labs: GTT passed. Hgb:10.8  Plts 236  Vaccines:   Flu declines TDAP: 3/6/25 RSV:32-36 weeks September-January   Rhogam: O Positive      GBS:Positive! NKDA    Pain management in labor :   -Epidural  -Lamaze   -Hydrotherapy        Postpartum  -Breast/Bottle   -Circ: yes!  -Pap: NA  -BC:      Problem List:  1) Transfer from [facility] at 27w0d   -no records for the first two visits.   2) Varicella NONIMMUNE   3) Gonorrhea + at 36 weeks   4) GBS positive            Past Medical History:   Diagnosis Date    Asthma     Chlamydia     Gonorrhea 2025     Past Surgical History:   Procedure Laterality Date    HERNIA REPAIR      as an infant/toddler per pt unsure of specific details     Social History     Socioeconomic History    Marital status: Single     Spouse name: None    Number of children: None    Years of education: None    Highest education level: None   Tobacco Use    Smoking status: Never      Passive exposure: Yes    Smokeless tobacco: Never   Vaping Use    Vaping status: Never Used   Substance and Sexual Activity    Alcohol use: Never    Drug use: Not Currently    Sexual activity: Yes     Partners: Male     Social Drivers of Health     Food Insecurity: Food Insecurity Present (4/15/2025)    Hunger Vital Sign     Worried About Running Out of Food in the Last Year: Sometimes true     Ran Out of Food in the Last Year: Sometimes true   Transportation Needs: Unmet Transportation Needs (4/15/2025)    PRAPARE - Transportation     Lack of Transportation (Medical): Yes     Lack of Transportation (Non-Medical): Yes   Housing Stability: High Risk (4/15/2025)    Housing Stability Vital Sign     Unable to Pay for Housing in the Last Year: Yes     Number of Times Moved in the Last Year: 0     Homeless in the Last Year: Yes     History reviewed. No pertinent family history.    No Known Allergies  Prior to Admission medications    Medication Sig Start Date End Date Taking? Authorizing Provider   ferrous sulfate (FE TABS 325) 325 (65 Fe) MG EC tablet Take 1 tablet by mouth daily (with breakfast) 3/9/25  Yes Sarah Wolfe MD   aspirin (ASPIRIN 81) 81 MG EC tablet Take 1 tablet by mouth daily Start at 12 weeks 1/23/25  Yes Sarah Wolfe MD   Prenatal MV-Min-Fe Fum-FA-DHA (PRENATAL 1 PO) Take by mouth   Yes Provider, MD Jerrod   acetaminophen (AMINOFEN) 325 MG tablet Take 2 tablets by mouth every 6 hours as needed for Pain or Fever 8/13/23 8/27/23  Marlena Anne PA        Review of Systems: Pertinent items are noted in HPI.    Objective:     Vitals:  Vitals:    04/15/25 1555   BP: 120/79   Pulse: 76   Resp: 16   Temp: 98.1 °F (36.7 °C)   TempSrc: Oral   SpO2: 98%        Physical Exam:  Patient without distress  Membranes:  Intact  Fetal Heart Rate: Reactive  Cervix:2-3/50/-3   [place]: rare   Abdomen: soft, gravid, non-tender     Prenatal Labs:   No components found for: \"OBEXTABORH\", \"OBEXTABSCRN\",

## 2025-04-16 ENCOUNTER — ANESTHESIA EVENT (OUTPATIENT)
Facility: HOSPITAL | Age: 20
End: 2025-04-16
Payer: MEDICAID

## 2025-04-16 ENCOUNTER — ANESTHESIA (OUTPATIENT)
Facility: HOSPITAL | Age: 20
End: 2025-04-16
Payer: MEDICAID

## 2025-04-16 PROCEDURE — 1120000000 HC RM PRIVATE OB

## 2025-04-16 PROCEDURE — 7210000100 HC LABOR FEE PER 1 HR

## 2025-04-16 PROCEDURE — 7100000001 HC PACU RECOVERY - ADDTL 15 MIN

## 2025-04-16 PROCEDURE — 6360000002 HC RX W HCPCS: Performed by: ANESTHESIOLOGY

## 2025-04-16 PROCEDURE — 51701 INSERT BLADDER CATHETER: CPT

## 2025-04-16 PROCEDURE — 88307 TISSUE EXAM BY PATHOLOGIST: CPT

## 2025-04-16 PROCEDURE — 6370000000 HC RX 637 (ALT 250 FOR IP): Performed by: OBSTETRICS & GYNECOLOGY

## 2025-04-16 PROCEDURE — 2580000003 HC RX 258: Performed by: OBSTETRICS & GYNECOLOGY

## 2025-04-16 PROCEDURE — 2500000003 HC RX 250 WO HCPCS: Performed by: ANESTHESIOLOGY

## 2025-04-16 PROCEDURE — 7100000000 HC PACU RECOVERY - FIRST 15 MIN

## 2025-04-16 PROCEDURE — 62325 NJX INTERLAMINAR CRV/THRC: CPT | Performed by: ANESTHESIOLOGY

## 2025-04-16 PROCEDURE — 7220000101 HC DELIVERY VAGINAL/SINGLE

## 2025-04-16 PROCEDURE — 6360000002 HC RX W HCPCS

## 2025-04-16 PROCEDURE — 6360000002 HC RX W HCPCS: Performed by: OBSTETRICS & GYNECOLOGY

## 2025-04-16 RX ORDER — MODIFIED LANOLIN
OINTMENT (GRAM) TOPICAL PRN
Status: DISCONTINUED | OUTPATIENT
Start: 2025-04-16 | End: 2025-04-18 | Stop reason: HOSPADM

## 2025-04-16 RX ORDER — SODIUM CHLORIDE 9 MG/ML
INJECTION, SOLUTION INTRAVENOUS PRN
Status: DISCONTINUED | OUTPATIENT
Start: 2025-04-16 | End: 2025-04-18 | Stop reason: HOSPADM

## 2025-04-16 RX ORDER — ONDANSETRON 2 MG/ML
4 INJECTION INTRAMUSCULAR; INTRAVENOUS EVERY 6 HOURS PRN
Status: DISCONTINUED | OUTPATIENT
Start: 2025-04-16 | End: 2025-04-18 | Stop reason: HOSPADM

## 2025-04-16 RX ORDER — NALBUPHINE HYDROCHLORIDE 10 MG/ML
10 INJECTION INTRAMUSCULAR; INTRAVENOUS; SUBCUTANEOUS
Status: DISCONTINUED | OUTPATIENT
Start: 2025-04-16 | End: 2025-04-18 | Stop reason: HOSPADM

## 2025-04-16 RX ORDER — ONDANSETRON 4 MG/1
4 TABLET, ORALLY DISINTEGRATING ORAL EVERY 6 HOURS PRN
Status: DISCONTINUED | OUTPATIENT
Start: 2025-04-16 | End: 2025-04-18 | Stop reason: HOSPADM

## 2025-04-16 RX ORDER — EPHEDRINE SULFATE 50 MG/ML
10 INJECTION INTRAVENOUS ONCE
Status: DISCONTINUED | OUTPATIENT
Start: 2025-04-16 | End: 2025-04-18 | Stop reason: HOSPADM

## 2025-04-16 RX ORDER — SODIUM CHLORIDE 0.9 % (FLUSH) 0.9 %
5-40 SYRINGE (ML) INJECTION PRN
Status: DISCONTINUED | OUTPATIENT
Start: 2025-04-16 | End: 2025-04-18 | Stop reason: HOSPADM

## 2025-04-16 RX ORDER — NALBUPHINE HYDROCHLORIDE 10 MG/ML
5 INJECTION INTRAMUSCULAR; INTRAVENOUS; SUBCUTANEOUS EVERY 4 HOURS PRN
Status: DISCONTINUED | OUTPATIENT
Start: 2025-04-16 | End: 2025-04-18 | Stop reason: HOSPADM

## 2025-04-16 RX ORDER — OXYCODONE HYDROCHLORIDE 5 MG/1
5 TABLET ORAL EVERY 4 HOURS PRN
Refills: 0 | Status: DISCONTINUED | OUTPATIENT
Start: 2025-04-16 | End: 2025-04-18 | Stop reason: HOSPADM

## 2025-04-16 RX ORDER — SODIUM CHLORIDE 0.9 % (FLUSH) 0.9 %
5-40 SYRINGE (ML) INJECTION EVERY 12 HOURS SCHEDULED
Status: DISCONTINUED | OUTPATIENT
Start: 2025-04-16 | End: 2025-04-18 | Stop reason: HOSPADM

## 2025-04-16 RX ORDER — FENTANYL/BUPIVACAINE/NS/PF 2-1250MCG
1-15 PLASTIC BAG, INJECTION (ML) INJECTION CONTINUOUS
Refills: 0 | Status: DISCONTINUED | OUTPATIENT
Start: 2025-04-16 | End: 2025-04-18 | Stop reason: HOSPADM

## 2025-04-16 RX ORDER — IBUPROFEN 400 MG/1
800 TABLET, FILM COATED ORAL EVERY 8 HOURS SCHEDULED
Status: DISCONTINUED | OUTPATIENT
Start: 2025-04-16 | End: 2025-04-18 | Stop reason: HOSPADM

## 2025-04-16 RX ORDER — FENTANYL CITRATE 50 UG/ML
100 INJECTION, SOLUTION INTRAMUSCULAR; INTRAVENOUS ONCE
Refills: 0 | Status: COMPLETED | OUTPATIENT
Start: 2025-04-16 | End: 2025-04-16

## 2025-04-16 RX ORDER — DOCUSATE SODIUM 100 MG/1
100 CAPSULE, LIQUID FILLED ORAL 2 TIMES DAILY
Status: DISCONTINUED | OUTPATIENT
Start: 2025-04-16 | End: 2025-04-18 | Stop reason: HOSPADM

## 2025-04-16 RX ORDER — ACETAMINOPHEN 500 MG
1000 TABLET ORAL EVERY 8 HOURS SCHEDULED
Status: DISCONTINUED | OUTPATIENT
Start: 2025-04-16 | End: 2025-04-18 | Stop reason: HOSPADM

## 2025-04-16 RX ORDER — NALOXONE HYDROCHLORIDE 0.4 MG/ML
INJECTION, SOLUTION INTRAMUSCULAR; INTRAVENOUS; SUBCUTANEOUS PRN
Status: DISCONTINUED | OUTPATIENT
Start: 2025-04-16 | End: 2025-04-18 | Stop reason: HOSPADM

## 2025-04-16 RX ORDER — BUPIVACAINE HYDROCHLORIDE 2.5 MG/ML
30 INJECTION, SOLUTION EPIDURAL; INFILTRATION; INTRACAUDAL; PERINEURAL ONCE
Status: COMPLETED | OUTPATIENT
Start: 2025-04-16 | End: 2025-04-16

## 2025-04-16 RX ORDER — LIDOCAINE HCL/EPINEPHRINE/PF 2%-1:200K
20 VIAL (ML) INJECTION ONCE
Status: COMPLETED | OUTPATIENT
Start: 2025-04-16 | End: 2025-04-16

## 2025-04-16 RX ORDER — ONDANSETRON 2 MG/ML
4 INJECTION INTRAMUSCULAR; INTRAVENOUS EVERY 6 HOURS PRN
Status: DISCONTINUED | OUTPATIENT
Start: 2025-04-16 | End: 2025-04-16 | Stop reason: SDUPTHER

## 2025-04-16 RX ORDER — OXYCODONE HYDROCHLORIDE 5 MG/1
10 TABLET ORAL EVERY 4 HOURS PRN
Refills: 0 | Status: DISCONTINUED | OUTPATIENT
Start: 2025-04-16 | End: 2025-04-18 | Stop reason: HOSPADM

## 2025-04-16 RX ADMIN — FENTANYL CITRATE 100 MCG: 50 INJECTION INTRAMUSCULAR; INTRAVENOUS at 08:24

## 2025-04-16 RX ADMIN — Medication 166.7 ML: at 13:46

## 2025-04-16 RX ADMIN — ONDANSETRON 4 MG: 4 TABLET, ORALLY DISINTEGRATING ORAL at 05:45

## 2025-04-16 RX ADMIN — BUPIVACAINE HYDROCHLORIDE 2 ML: 2.5 INJECTION, SOLUTION EPIDURAL; INFILTRATION; INTRACAUDAL; PERINEURAL at 08:25

## 2025-04-16 RX ADMIN — SODIUM CHLORIDE, SODIUM LACTATE, POTASSIUM CHLORIDE, AND CALCIUM CHLORIDE: .6; .31; .03; .02 INJECTION, SOLUTION INTRAVENOUS at 11:13

## 2025-04-16 RX ADMIN — OXYTOCIN 2 MILLI-UNITS/MIN: 10 INJECTION, SOLUTION INTRAMUSCULAR; INTRAVENOUS at 05:49

## 2025-04-16 RX ADMIN — SODIUM CHLORIDE, SODIUM LACTATE, POTASSIUM CHLORIDE, AND CALCIUM CHLORIDE 500 ML: .6; .31; .03; .02 INJECTION, SOLUTION INTRAVENOUS at 04:54

## 2025-04-16 RX ADMIN — SODIUM CHLORIDE 2.5 MILLION UNITS: 9 INJECTION, SOLUTION INTRAVENOUS at 09:54

## 2025-04-16 RX ADMIN — OXYTOCIN 1 MILLI-UNITS/MIN: 10 INJECTION, SOLUTION INTRAMUSCULAR; INTRAVENOUS at 09:05

## 2025-04-16 RX ADMIN — NALBUPHINE HYDROCHLORIDE 10 MG: 10 INJECTION, SOLUTION INTRAMUSCULAR; INTRAVENOUS; SUBCUTANEOUS at 05:46

## 2025-04-16 RX ADMIN — SODIUM CHLORIDE, SODIUM LACTATE, POTASSIUM CHLORIDE, AND CALCIUM CHLORIDE 500 ML: .6; .31; .03; .02 INJECTION, SOLUTION INTRAVENOUS at 08:00

## 2025-04-16 RX ADMIN — PENICILLIN G POTASSIUM 5 MILLION UNITS: 5000000 INJECTION, POWDER, FOR SOLUTION INTRAMUSCULAR; INTRAVENOUS at 05:47

## 2025-04-16 RX ADMIN — Medication 10 ML/HR: at 08:56

## 2025-04-16 RX ADMIN — Medication 25 MCG: at 01:35

## 2025-04-16 RX ADMIN — LIDOCAINE HYDROCHLORIDE,EPINEPHRINE BITARTRATE 5 ML: 20; .005 INJECTION, SOLUTION EPIDURAL; INFILTRATION; INTRACAUDAL; PERINEURAL at 08:22

## 2025-04-16 RX ADMIN — BUPIVACAINE HYDROCHLORIDE 2 ML: 2.5 INJECTION, SOLUTION EPIDURAL; INFILTRATION; INTRACAUDAL; PERINEURAL at 08:27

## 2025-04-16 ASSESSMENT — PAIN SCALES - GENERAL: PAINLEVEL_OUTOF10: 7

## 2025-04-16 NOTE — ANESTHESIA POSTPROCEDURE EVALUATION
Department of Anesthesiology  Postprocedure Note    Patient: Billie Lechuga  MRN: [id number]  YOB: 2005  Date of evaluation: 4/16/2025    Procedure Summary       Date: 04/16/25 Room / Location:     Anesthesia Start: 0812 Anesthesia Stop: 1339    Procedure: Labor Analgesia Diagnosis:     Scheduled Providers:  Responsible Provider: Omari Church DO    Anesthesia Type: epidural ASA Status: 2            Anesthesia Type: No value filed.    Juana Phase I:      Juana Phase II:      Anesthesia Post Evaluation    Patient location during evaluation: bedside  Patient participation: complete - patient participated  Level of consciousness: awake and alert  Pain score: 0  Airway patency: patent  Nausea & Vomiting: no nausea  Cardiovascular status: hemodynamically stable  Respiratory status: acceptable  Hydration status: euvolemic  Comments: Seen, no complaints   Pain management: adequate    No notable events documented.

## 2025-04-16 NOTE — PROGRESS NOTES
0740 Bedside report received from JASON Pineda RN. Pt resting in bed. FOB at bedside. Pt reports pain is becoming more intense again. Offered pt to do spinning babies circuit or reposition. Pt refused at this time. VSS. Pitocin and LR running per orders.    0800 RN Called to bedside, pt requesting an epidural. IVF bolus started, anesthesia paged. Pitocin turned off.    0830 Epidural placed by Dr Church, pt tolerated well.     0900 Pt resting comfortably in bed. Repositioned to R lateral. Pitocin restarted.    0945 2nd dose pcn hung per orders.    1100 Straight cath by RN, 400cc urine. SVE per pt request. 4/80/0, SROM noted,    1200 Pt repositioned to R lateral in trendelenberg. Bloody show noted.     1230 Dr Wolfe at bedside. SVE 8cm/0 station.     1250 Pitocin turned down from 7 to 3 per provider order.     1300 Sve per pt request. 10/100/+1. Pt feeling great deal of pressure. Pt wants to wait for her mom to get here before we start pusihg.     1305 Straight cath by RN. 300cc. Pt family at bedside, pt to start pushing. RN at bedside, continuously monitoring FHR tracing    1315 RN at bedside, continuously monitoring FHR tracing    1330 RN at bedside, continuously monitoring FHR tracing    1339  baby boy

## 2025-04-16 NOTE — PROGRESS NOTES
Labor Progress Note:  Billie just received a dose of IV pain medication. She was able to get some sleep over night.         Vitals:    25 0646   BP: 124/78   Pulse: 63   Resp: 16   Temp: 98.1 °F (36.7 °C)   SpO2: 98%      Cervix:deferred   [place]:irregular q2-5  FHT:120, moderate variability, accelerations present, rare variable decelerations         A:  Billie Lechuga is 19 y.o.  at 38w6d   1)IOL for FGR (2%)  -s/p three doses of Cytotec   -pitocin infusing at 6 milliunits   2)GBS positive   -penicillin is infusing   3)Reassuring fetal status     P:  Titrate pitocin to effect   Position changes  Pain medication as desired       Sarah Wolfe MD

## 2025-04-16 NOTE — ANESTHESIA PROCEDURE NOTES
Epidural Block    Patient location during procedure: OB  Reason for block: procedure for pain, labor epidural and at surgeon's request  Staffing  Performed: anesthesiologist   Performed by: Omari Church DO  Authorized by: Omari Church DO    Epidural  Patient position: sitting  Prep: ChloraPrep  Patient monitoring: cardiac monitor, continuous pulse ox and frequent blood pressure checks  Approach: midline  Location: L3-4  Injection technique: SKYLER air  Provider prep: mask and sterile gloves  Needle  Needle type: Tuohy   Needle gauge: 17 G  Needle length: 3.5 in  Catheter type: end hole  Catheter size: 18 G  Test dose: negativeCatheter Secured: tegaderm and tape  Assessment  Hemodynamics: stable  Attempts: 2  Outcomes: uncomplicated and patient tolerated procedure well  Preanesthetic Checklist  Completed: patient identified, IV checked, site marked, risks and benefits discussed, surgical/procedural consents, equipment checked, pre-op evaluation, timeout performed, anesthesia consent given, oxygen available and monitors applied/VS acknowledged

## 2025-04-16 NOTE — ANESTHESIA PRE PROCEDURE
Department of Anesthesiology  Preprocedure Note       Name:  Billie Lechuga   Age:  19 y.o.  :  2005                                          MRN:  [id number]         Date:  2025      Surgeon: * No surgeons listed *    Procedure: * No procedures listed *    Medications prior to admission:   Prior to Admission medications    Medication Sig Start Date End Date Taking? Authorizing Provider   ferrous sulfate (FE TABS 325) 325 (65 Fe) MG EC tablet Take 1 tablet by mouth daily (with breakfast) 3/9/25  Yes Sarah Wolfe MD   aspirin (ASPIRIN 81) 81 MG EC tablet Take 1 tablet by mouth daily Start at 12 weeks 25  Yes Sarah Wolfe MD   Prenatal MV-Min-Fe Fum-FA-DHA (PRENATAL 1 PO) Take by mouth   Yes Provider, MD Jerrod   acetaminophen (AMINOFEN) 325 MG tablet Take 2 tablets by mouth every 6 hours as needed for Pain or Fever 23  Marlena Anne PA       Current medications:    Current Facility-Administered Medications   Medication Dose Route Frequency Provider Last Rate Last Admin   • nalbuphine (NUBAIN) injection 10 mg  10 mg IntraVENous Q3H PRN Nolan Kirkpatrick APRN - CNM   10 mg at 25 0546   • fentaNYL 2 mcg/mL BUPivacaine 0.125% in sodium chloride 0.9% 100 mL epidural infusion  1-15 mL/hr Epidural Continuous Omari Church R, DO       • naloxone 0.4 mg in 10 mL sodium chloride syringe   IntraVENous PRN Omari Church, DO       • nalbuphine (NUBAIN) injection 5 mg  5 mg IntraVENous Q4H PRN Omari Church, DO       • Lidocaine-EPINEPHrine (PF) 2 percent-1:[id number] injection 20 mL  20 mL Epidural Once Omari Church, DO       • BUPivacaine (PF) (MARCAINE) 0.25 % injection 75 mg  30 mL Epidural Once Omari Church, DO       • fentaNYL (SUBLIMAZE) injection 100 mcg  100 mcg Epidural Once Omari Church, DO       • ePHEDrine injection 10 mg  10 mg IntraVENous Once Omari Church, DO       • terbutaline (BRETHINE) injection 0.25 mg  0.25 mg SubCUTAneous Once

## 2025-04-16 NOTE — PROGRESS NOTES
Billie is comfortable with her epidural.     BP (!) 141/73   Pulse 53   Temp 98.6 °F (37 °C)   Resp 16   LMP 2024   SpO2 100%   PF (!) 15 L/min   Cervix: 8/90/0  FHT: 120 moderate variability with periods of minimal variability, early decelerations present, positive scalp stimulation present.   [place]: q2-3      IOL for FGR (2%)  -pitocin infusing   GBS positive   -adequate treatment with PCN  Recent gonorrhea infection   -s/p treatment       Anticipate

## 2025-04-17 PROCEDURE — 1120000000 HC RM PRIVATE OB

## 2025-04-17 PROCEDURE — 87591 N.GONORRHOEAE DNA AMP PROB: CPT

## 2025-04-17 PROCEDURE — 6370000000 HC RX 637 (ALT 250 FOR IP): Performed by: OBSTETRICS & GYNECOLOGY

## 2025-04-17 PROCEDURE — 87491 CHLMYD TRACH DNA AMP PROBE: CPT

## 2025-04-17 PROCEDURE — 59410 OBSTETRICAL CARE: CPT | Performed by: OBSTETRICS & GYNECOLOGY

## 2025-04-17 RX ADMIN — IBUPROFEN 800 MG: 400 TABLET, FILM COATED ORAL at 11:25

## 2025-04-17 RX ADMIN — IBUPROFEN 800 MG: 400 TABLET, FILM COATED ORAL at 03:00

## 2025-04-17 RX ADMIN — DOCUSATE SODIUM 100 MG: 100 CAPSULE, LIQUID FILLED ORAL at 08:19

## 2025-04-17 RX ADMIN — ACETAMINOPHEN 1000 MG: 500 TABLET ORAL at 08:18

## 2025-04-17 RX ADMIN — ACETAMINOPHEN 1000 MG: 500 TABLET ORAL at 20:18

## 2025-04-17 ASSESSMENT — PAIN SCALES - GENERAL
PAINLEVEL_OUTOF10: 10
PAINLEVEL_OUTOF10: 0
PAINLEVEL_OUTOF10: 0

## 2025-04-17 NOTE — LACTATION NOTE
This note was copied from a baby's chart.  Not seen at breast, mother declines LC consult, expresses confidence in ability to breastfeed independently.  Mother has a mixed feeding plan.  We talked about obtaining a pump at home and resources for new mothers.  Message left for care management.

## 2025-04-17 NOTE — PROGRESS NOTES
Bedside shift change report given to KARLEE Caballero (oncoming nurse) by CIPRIANO Johnson (offgoing nurse). Report included the following information Nurse Handoff Report.

## 2025-04-17 NOTE — DISCHARGE INSTRUCTIONS
Postpartum Support Groups  We know that all of us are dealing with a tremendous amount of uncertainty, confusion and disruption to our daily lives, which may result in increased anxiety, depression and fear. If you are feeling unsettled or worse, please know that we are here to help. During this time of increased caution and care for one another, [facility] [place] ([facility]) is offering virtual support groups to ALL MOTHERS in [place] regardless of the age of your child/children as a way to help weather this emotional storm together. Social support is an important part of self-care during this time of physical distancing.  Virtual postpartum support group meetings available at www.postpartumva.org  Warm Line: 316.326.9526    Breastfeeding Support Groups    and  of each month at [place] from  and  of each month at [place] from 10-11a      [email or web address]-[email or web address]-events         After Your Delivery (the Postpartum Period): Care Instructions  Overview     After childbirth (postpartum period), your body goes through many changes as you recover. In these weeks after delivery, try to take good care of yourself. Get rest whenever you can and accept help from others.  It may take 4 to 6 weeks to feel like yourself again, and possibly longer if you had a  birth. You may feel sore or very tired as you recover. After delivery, you may continue to have contractions as the uterus returns to the size it was before your pregnancy. You will also have some vaginal bleeding. And you may have pain around the vagina as you heal. Several days after delivery you may also have pain and swelling in your breasts as they fill with milk. There are things you can do at home to help ease these discomforts.  After childbirth, it's common to feel emotional. You may feel irritable, cry easily, and feel happy one minute and sad the next. This is called the

## 2025-04-17 NOTE — PROGRESS NOTES
0730 - Bedside shift change report given to CIPRIANO Johnson RN (oncoming nurse) by LILLI Caballero & KARLEE Dunn RN (offgoing nurse). Report included the following information Nurse Handoff Report.

## 2025-04-17 NOTE — PROGRESS NOTES
Patient here for return OB visit at 38w6d.     FGR by today's US. BEN 7. Rec IOL. Sent to hospital       She reports good fetal movement.   She denies vaginal bleeding, loss of fluid, abnormal vaginal discharge, UTI symptoms, cramping, or contractions.       /80 (BP Site: Left Upper Arm, Patient Position: Sitting)   Pulse 84   Temp 98.1 °F (36.7 °C) (Oral)   Resp 16   Ht 1.6 m (5' 3\")   Wt 64.3 kg (141 lb 12.8 oz)   LMP 2024   SpO2 98%   BMI 25.12 kg/m²        Prenatal Fetal Information  Fetal HR: u/s today  Movement: Present      Patient Active Problem List    Diagnosis Date Noted    Pregnancy affected by fetal growth restriction 04/15/2025    Pregnancy 2025     Primary Provider: Aiden ENCISO   EDC by: 1st TM   Social: transfer from [facility] at 27w0d   IOB labs: Hep B neg.Hep C neg. RPR neg. HIV neg. Varicella NON IMMUNE /Rubella immune O Positive  . Normal hemoglobin electrophoresis.  Genetic Screening:Panoroma declined Horizon declined  Anatomy:normal with [facility]  3rd TM labs: GTT passed. Hgb:10.8  Plts 236  Vaccines:   Flu declines TDAP: 3/6/25 RSV:32-36 weeks September-January   Rhogam: O Positive      GBS:Positive! NKDA    Pain management in labor :   -Epidural  -Lamaze   -Hydrotherapy        Postpartum  -Breast/Bottle   -Circ: yes!  -Pap: NA  -BC:      Problem List:  1) Transfer from [facility] at 27w0d   -no records for the first two visits.   2) Varicella NONIMMUNE   3) Gonorrhea + at 36 weeks   4) GBS positive            Return for PPV.    Sarah Wolfe MD

## 2025-04-17 NOTE — LACTATION NOTE
This note was copied from a baby's chart.  . Baby is SGA. Blood sugars are 59, 53, and 57. Mother states that she did notice breast changes during this pregnancy. Mother states that she would like to nurse and supplement baby with formula. Mother states that baby took 8 ml at his first feeding. Educated mother about maintaining milk supply. Brought mother a hospital grade pump and showed her how to use. Educated mother about feeding cues, feeding on demand, offering both breasts at every feeding, and feeding at least every 3 hours. MIU RN reports that baby had a difficult time taking the bottle. Brought mother a Dr. Brown's bottle. Showed mother how to use the DrCarol Brown's bottle. Baby has an uncoordinated suck. Showed mother suck training. Baby took 10 ml of formula with the  Brown's bottle.

## 2025-04-18 VITALS
TEMPERATURE: 97.3 F | SYSTOLIC BLOOD PRESSURE: 126 MMHG | HEART RATE: 65 BPM | DIASTOLIC BLOOD PRESSURE: 91 MMHG | OXYGEN SATURATION: 100 % | RESPIRATION RATE: 16 BRPM

## 2025-04-18 LAB
C TRACH DNA SPEC QL NAA+PROBE: NEGATIVE
N GONORRHOEA DNA SPEC QL NAA+PROBE: NEGATIVE
SAMPLE TYPE: NORMAL
SERVICE CMNT-IMP: NORMAL
SPECIMEN SOURCE: NORMAL

## 2025-04-18 PROCEDURE — 99024 POSTOP FOLLOW-UP VISIT: CPT | Performed by: OBSTETRICS & GYNECOLOGY

## 2025-04-18 PROCEDURE — 6370000000 HC RX 637 (ALT 250 FOR IP): Performed by: OBSTETRICS & GYNECOLOGY

## 2025-04-18 RX ORDER — ACETAMINOPHEN 500 MG
1000 TABLET ORAL EVERY 8 HOURS SCHEDULED
Qty: 120 TABLET | Refills: 3 | Status: SHIPPED | OUTPATIENT
Start: 2025-04-18

## 2025-04-18 RX ORDER — IBUPROFEN 800 MG/1
800 TABLET, FILM COATED ORAL EVERY 8 HOURS SCHEDULED
Qty: 120 TABLET | Refills: 3 | Status: SHIPPED | OUTPATIENT
Start: 2025-04-18

## 2025-04-18 RX ADMIN — IBUPROFEN 800 MG: 400 TABLET, FILM COATED ORAL at 03:43

## 2025-04-18 RX ADMIN — IBUPROFEN 800 MG: 400 TABLET, FILM COATED ORAL at 11:34

## 2025-04-18 NOTE — PROGRESS NOTES
Postpartum Rounding Note    Billie Lechuga 19 y.o.      PPD # 1    Subjective:   Patient doing well. Has no complaints.  Pain is well controlled. Baby is at the bedside.  She is breast feeding. Patient is urinating adequately, ambulating, and on a regular diet.  Lochia appropriate. No nausea, vomiting, fever, chills, CP, SOB, calf pain.    Objective:   /77   Pulse 67   Temp 98.1 °F (36.7 °C) (Oral)   Resp 16   LMP 2024   SpO2 97%   PF (!) 15 L/min   Breastfeeding Unknown    Gen: NAD, sitting up feeding baby  Ext: nontender, no edema noted    Labs:   WBC   Date/Time Value Ref Range Status   04/15/2025 04:19 PM 5.7 3.6 - 11.0 K/uL Final   2025 10:54 AM 6.8 3.4 - 10.8 x10E3/uL Final     Hemoglobin   Date/Time Value Ref Range Status   04/15/2025 04:19 PM 9.4 (L) 11.5 - 16.0 g/dL Final   2025 10:54 AM 10.8 (L) 11.1 - 15.9 g/dL Final     Hematocrit   Date/Time Value Ref Range Status   04/15/2025 04:19 PM 28.5 (L) 35.0 - 47.0 % Final   2025 10:54 AM 31.6 (L) 34.0 - 46.6 % Final     Platelets   Date/Time Value Ref Range Status   04/15/2025 04:19  150 - 400 K/uL Final   2025 10:54  150 - 450 x10E3/uL Final      No intake or output data in the 24 hours ending 25 7202    Assessment:   Billie Lechuga 19 y.o.    PPD # 1    Afebrile, VSS, tolerating pain with medication, regular diet, adequate urine output   O POSITIVE  Recent gonorrhea   -s/p treatment of her and her partner  -DREW pending       Plan:   Continue with current management   Discharge home tomorrow if no complications arise       Sarah Wolfe MD

## 2025-04-18 NOTE — LACTATION NOTE
This note was copied from a baby's chart.  Mom has mostly been feeding formula. She states she wants to breast and formula feed infant. Suggested she pump if infant not feeding at breast, if her desire is to provide breastmilk. She has a pump for at home use. Infant weight reflects a gain.

## 2025-04-18 NOTE — PROGRESS NOTES
0800: Bedside and Verbal shift change report given to Rose Marie Zuñiga RN   (oncoming nurse) by Maddi Forman RN (offgoing nurse). Report included the following information Nurse Handoff Report, MAR, Recent Results, and Med Rec Status.     1540: I have reviewed discharge instructions with the patient. The patient verbalized understanding.

## 2025-04-18 NOTE — DISCHARGE SUMMARY
Obstetrical Discharge Summary     Name: Billie Lechuga MRN: [id number]  SSN: [id number]    YOB: 2005  Age: 19 y.o.  Sex: female      Admit Date: 4/15/2025    Discharge Date: 2025     Admitting Physician: Sarah Wolfe MD     Attending Physician:  Sarah Wolfe MD     Admission Diagnoses: Pregnancy affected by fetal growth restriction [O36.5990]    Discharge Diagnoses:   Information for the patient's :  SUSANNE Lechuga [[id number]]   @[id number]@     Additional Diagnoses:  No components found for: \"OBEXTABORH\", \"OBEXTABSCRN\", \"OBEXTRUBELLA\", \"OBEXTGRBS\"    Hospital Course: Normal hospital course following the delivery.    Disposition at Discharge: Home or self care    Discharged Condition: Stable    Patient Instructions:   Current Discharge Medication List        CONTINUE these medications which have NOT CHANGED    Details   ferrous sulfate (FE TABS 325) 325 (65 Fe) MG EC tablet Take 1 tablet by mouth daily (with breakfast)  Qty: 30 tablet, Refills: 11      aspirin (ASPIRIN 81) 81 MG EC tablet Take 1 tablet by mouth daily Start at 12 weeks  Qty: 30 tablet, Refills: 6      Prenatal MV-Min-Fe Fum-FA-DHA (PRENATAL 1 PO) Take by mouth      acetaminophen (AMINOFEN) 325 MG tablet Take 2 tablets by mouth every 6 hours as needed for Pain or Fever  Qty: 56 tablet, Refills: 0             Reference my discharge instructions.    No follow-ups on file.     Signed By:  Sarah Wolfe MD     2025

## 2025-04-18 NOTE — PROGRESS NOTES
Postpartum Rounding Note    Billie Lechuga 19 y.o.      PPD # 2    Subjective:   Patient doing well. Has no complaints.  Pain is well controlled with motrin and tylenol . Baby is at the bedside.  She is breast feeding. Patient is urinating adequately, ambulating, and on a regular diet.  Lochia appropriate. No nausea, vomiting, fever, chills, CP, SOB, calf pain.    Objective:   /75   Pulse 65   Temp 98.2 °F (36.8 °C) (Oral)   Resp 16   LMP 2024   SpO2 98%   PF (!) 15 L/min   Breastfeeding Unknown    Gen: NAD  Abd: soft, appropriately TTP, uterine fundus firm, below the umbilicus  Ext: nontender, no edema noted    Labs:   WBC   Date/Time Value Ref Range Status   04/15/2025 04:19 PM 5.7 3.6 - 11.0 K/uL Final   2025 10:54 AM 6.8 3.4 - 10.8 x10E3/uL Final     Hemoglobin   Date/Time Value Ref Range Status   04/15/2025 04:19 PM 9.4 (L) 11.5 - 16.0 g/dL Final   2025 10:54 AM 10.8 (L) 11.1 - 15.9 g/dL Final     Hematocrit   Date/Time Value Ref Range Status   04/15/2025 04:19 PM 28.5 (L) 35.0 - 47.0 % Final   2025 10:54 AM 31.6 (L) 34.0 - 46.6 % Final     Platelets   Date/Time Value Ref Range Status   04/15/2025 04:19  150 - 400 K/uL Final   2025 10:54  150 - 450 x10E3/uL Final      No intake or output data in the 24 hours ending 25 0811    Assessment:   Billie Lechuga 19 y.o.    PPD # 2    Afebrile, VSS, tolerating pain with medication, regular diet, adequate urine output   O POSITIVE  Recent gonorrhea infection   -mother and FOB both treated  -per patient no intercourse since treatment   -awaiting DREW      Plan:   Continue with current management   Discharge home today pending DREW results          -Will send patient home with rx: motrin and tylenol         -Discussed post partum care and when to follow up   Rhogam not needed before discharge    Sarah Wolfe MD

## 2025-04-18 NOTE — L&D DELIVERY NOTE
SUSANNE Lechuga [[id number]]      Labor Events     Labor: No   Steroids: None  Cervical Ripening Date/Time:  4/15/25 17:27:00   Cervical Ripening Type: Misoprostol  Antibiotics Received during Labor: Yes  Rupture Date/Time:  25 11:12:00   Rupture Type: SROM, Intact  Induction: Oxytocin, Misoprostol  Labor Complications: None       Anesthesia    Method: Epidural       Labor Length    3rd stage: 0h 10m       Delivery Details      Delivery Date: 25 Delivery Time: 13:39:00   Delivery Type: Vaginal, Spontaneous              [place] Presentation    Presentation: Vertex       Shoulder Dystocia    Shoulder Dystocia Present?: No       Assisted Delivery Details    Forceps Attempted?: No  Vacuum Extractor Attempted?: No                           Cord    Vessels: 3 Vessels  Complications: None  Delayed Cord Clamping?: Yes  Cord Clamped Date/Time: 2025 13:40:00  Cord Blood Disposition: Lab  Gases Sent?: No              Placenta    Date/Time: 2025 13:49:00  Removal: Spontaneous  Appearance: Intact  Disposition: Discarded       Lacerations    Episiotomy: None  Perineal Lacerations: None  Other Lacerations: no non-perineal laceration       Vaginal Counts    Initial Count Personnel: LUIS BHAKTA RN  Initial Count Verified By: KATINA QUEZADA RN  Intial Sponge Count: Correct  Intial Instruments Count: Correct   Final Sponges Count: Correct  Final Instruments Count: Correct   Final Count Personnel: LUIS BHAKTA RN  Final Count Verified By: DR. TOMPKINS  Accurate Final Count?: Yes       Blood Loss  Mother: [place], Billie #[id number]     Start of Mother's Information      Delivery Blood Loss   Intrapartum & Postpartum: 25 0139 - 25 1339    Delivery Admission: 04/15/25 1553 - 25 2225         Intrapartum & Postpartum Delivery Admission    Quantitative Blood Loss (mL) Hospital Encounter 52 grams 52 grams    Total  52 mL 52 mL               End of Mother's Information  Mother:

## 2025-04-21 ENCOUNTER — RESULTS FOLLOW-UP (OUTPATIENT)
Age: 20
End: 2025-04-21

## 2025-04-21 LAB
N GONORRHOEA RRNA SPEC QL NAA+PROBE: NEGATIVE
SPECIMEN SOURCE: NORMAL

## 2025-04-23 ENCOUNTER — TELEPHONE (OUTPATIENT)
Age: 20
End: 2025-04-23